# Patient Record
Sex: MALE | Race: WHITE | URBAN - NONMETROPOLITAN AREA
[De-identification: names, ages, dates, MRNs, and addresses within clinical notes are randomized per-mention and may not be internally consistent; named-entity substitution may affect disease eponyms.]

---

## 2020-05-28 ENCOUNTER — IMPORTED ENCOUNTER (OUTPATIENT)
Dept: URBAN - NONMETROPOLITAN AREA CLINIC 1 | Facility: CLINIC | Age: 45
End: 2020-05-28

## 2020-05-28 PROBLEM — H52.4: Noted: 2020-05-28

## 2020-05-28 PROBLEM — H52.223: Noted: 2020-05-28

## 2020-05-28 PROBLEM — H52.13: Noted: 2020-05-28

## 2020-05-28 PROCEDURE — S0620 ROUTINE OPHTHALMOLOGICAL EXA: HCPCS

## 2020-05-28 PROCEDURE — 92310 CONTACT LENS FITTING OU: CPT

## 2020-05-28 PROCEDURE — V2520 CONTACT LENS HYDROPHILIC: HCPCS

## 2020-05-28 NOTE — PATIENT DISCUSSION
Compound Myopic Astigmatism OU w/Presbyopia-  discussed findings w/patient-  new spectacle Rx issued-  New CL Rx issued. Pt requests to same power both eyes (1.75 Air Optix OU).  CL trials dispensed to patient -  Monitor yearly or PRN; 's Notes: MR 5/28/2020DFE 5/28/2020

## 2022-04-10 ASSESSMENT — VISUAL ACUITY
OD_CC: 20/50
OS_CC: 20/70

## 2022-04-10 ASSESSMENT — KERATOMETRY
OD_K2POWER_DIOPTERS: 44.00
OS_AXISANGLE_DEGREES: 009
OD_AXISANGLE_DEGREES: 012
OD_K1POWER_DIOPTERS: 43.25
OS_K1POWER_DIOPTERS: 43.75
OS_K2POWER_DIOPTERS: 44.00

## 2022-04-10 ASSESSMENT — TONOMETRY
OS_IOP_MMHG: 13
OD_IOP_MMHG: 13